# Patient Record
(demographics unavailable — no encounter records)

---

## 2025-07-07 NOTE — PHYSICAL EXAM
[1+] : left 1+ [2+] : left 2+ [Ankle Swelling (On Exam)] : present [Ankle Swelling Bilaterally] : bilaterally  [Varicose Veins Of Lower Extremities] : bilaterally [Ankle Swelling On The Right] : mild [] : bilaterally

## 2025-07-07 NOTE — REVIEW OF SYSTEMS
[Arthralgias] : arthralgias [Joint Swelling] : joint swelling [Joint Stiffness] : joint stiffness [Limb Pain] : limb pain [Limb Swelling] : limb swelling [Negative] : Heme/Lymph

## 2025-07-07 NOTE — HISTORY OF PRESENT ILLNESS
[FreeTextEntry1] : The 78-year-old female with a history of coronary artery disease who was followed by Dr. Marcos Lara for mild lower extremity arterial occlusive disease.  Patient denies lower extremity symptoms of intermittent claudication or arterial rest pain.  She does complain of pain down the extremities at night when sleeping in certain positions.  She has a history of arthritis.

## 2025-07-07 NOTE — ASSESSMENT
[FreeTextEntry1] : Lower extremity ankle-brachial indices with volume waveforms revealed normal arterial hemodynamics bilaterally.  Patient's lower extremity pain appears musculoskeletal in nature and not secondary to vascular disease.  No vascular surgical intervention required.  Patient to continue risk factors modified appropriately.  Follow-up evaluation in 1 year recommended.    33 minutes were spent with the patient discussing symptoms, physical exam findings, vascular lab testing results, underlying condition and treatment options.

## 2025-07-14 NOTE — REASON FOR VISIT
[Initial] : an initial visit [Asthma] : asthma [Shortness of Breath] : shortness of breath [TextEntry] : This is 78-year-old female referred by her cardiology for evaluation for cough wheezing shortness of breath.  Patient non-smoker never smoked before she reported follow-up the last 4 months been complaining of a dry cough more in the morning and during the day and been having episode she feels chest tightness and wheezing she do have history of GERD and she is on medication denies any runny nose or postnasal drip no fever no chills Also patient has a lot of symptoms sleep apnea such as daytime fatigue snoring at night wake up choking and other

## 2025-07-14 NOTE — PLAN
[TextEntry] : -sunshine will proceed with sleep study  counseled for weight reduction  told not to drive if feel tired  counseled for sleep hygiene -asthma cough  start breo albuterol as needed  PFT  cxr  blood for cbc, asthma profile , IGE

## 2025-07-14 NOTE — HISTORY OF PRESENT ILLNESS
[Initial Evaluation] : an initial evaluation of [Snoring] : snoring [Unrefreshing Sleep] : unrefreshing sleep [Sleepy When Sedentary] : sleepy when sedentary [Obesity] : obesity [Hypertension] : hypertension